# Patient Record
Sex: MALE | Race: WHITE | Employment: FULL TIME | URBAN - METROPOLITAN AREA
[De-identification: names, ages, dates, MRNs, and addresses within clinical notes are randomized per-mention and may not be internally consistent; named-entity substitution may affect disease eponyms.]

---

## 2017-03-06 ENCOUNTER — OFFICE VISIT (OUTPATIENT)
Dept: INTERNAL MEDICINE | Facility: CLINIC | Age: 32
End: 2017-03-06
Payer: COMMERCIAL

## 2017-03-06 VITALS
SYSTOLIC BLOOD PRESSURE: 132 MMHG | OXYGEN SATURATION: 100 % | DIASTOLIC BLOOD PRESSURE: 78 MMHG | HEIGHT: 70 IN | WEIGHT: 141 LBS | TEMPERATURE: 98.1 F | BODY MASS INDEX: 20.19 KG/M2 | HEART RATE: 66 BPM

## 2017-03-06 DIAGNOSIS — Z71.85 VACCINE COUNSELING: ICD-10-CM

## 2017-03-06 DIAGNOSIS — Z00.00 ROUTINE GENERAL MEDICAL EXAMINATION AT A HEALTH CARE FACILITY: Primary | ICD-10-CM

## 2017-03-06 DIAGNOSIS — Z13.1 SCREENING FOR DIABETES MELLITUS: ICD-10-CM

## 2017-03-06 DIAGNOSIS — Z71.89 OTHER SPECIFIED COUNSELING: Chronic | ICD-10-CM

## 2017-03-06 DIAGNOSIS — Z13.220 SCREENING FOR LIPOID DISORDERS: ICD-10-CM

## 2017-03-06 LAB
CHOLEST SERPL-MCNC: 124 MG/DL
HDLC SERPL-MCNC: 47 MG/DL
LDLC SERPL CALC-MCNC: 60 MG/DL
NONHDLC SERPL-MCNC: 77 MG/DL
TRIGL SERPL-MCNC: 87 MG/DL

## 2017-03-06 PROCEDURE — 90632 HEPA VACCINE ADULT IM: CPT | Performed by: FAMILY MEDICINE

## 2017-03-06 PROCEDURE — 36415 COLL VENOUS BLD VENIPUNCTURE: CPT | Performed by: FAMILY MEDICINE

## 2017-03-06 PROCEDURE — 90715 TDAP VACCINE 7 YRS/> IM: CPT | Performed by: FAMILY MEDICINE

## 2017-03-06 PROCEDURE — 90471 IMMUNIZATION ADMIN: CPT | Performed by: FAMILY MEDICINE

## 2017-03-06 PROCEDURE — 90472 IMMUNIZATION ADMIN EACH ADD: CPT | Performed by: FAMILY MEDICINE

## 2017-03-06 PROCEDURE — 82947 ASSAY GLUCOSE BLOOD QUANT: CPT | Performed by: FAMILY MEDICINE

## 2017-03-06 PROCEDURE — 99395 PREV VISIT EST AGE 18-39: CPT | Mod: 25 | Performed by: FAMILY MEDICINE

## 2017-03-06 PROCEDURE — 80061 LIPID PANEL: CPT | Performed by: FAMILY MEDICINE

## 2017-03-06 PROCEDURE — 90691 TYPHOID VACCINE IM: CPT | Performed by: FAMILY MEDICINE

## 2017-03-06 RX ORDER — CIPROFLOXACIN 500 MG/1
500 TABLET, FILM COATED ORAL 2 TIMES DAILY
Qty: 6 TABLET | Refills: 0 | Status: SHIPPED | OUTPATIENT
Start: 2017-03-06 | End: 2019-04-18

## 2017-03-06 NOTE — NURSING NOTE
"Chief Complaint   Patient presents with     Physical     fasting        Initial /78 (BP Location: Left arm, Patient Position: Chair)  Pulse 66  Temp 98.1  F (36.7  C) (Oral)  Ht 5' 10\" (1.778 m)  Wt 141 lb (64 kg)  SpO2 100%  BMI 20.23 kg/m2 Estimated body mass index is 20.23 kg/(m^2) as calculated from the following:    Height as of this encounter: 5' 10\" (1.778 m).    Weight as of this encounter: 141 lb (64 kg).  Medication Reconciliation: complete    "

## 2017-03-06 NOTE — PROGRESS NOTES
SUBJECTIVE:     CC: Brad Singh is an 32 year old male who presents for preventative health visit.     Healthy Habits:    Do you get at least three servings of calcium containing foods daily (dairy, green leafy vegetables, etc.)? yes    Amount of exercise or daily activities, outside of work: 2-3 day(s) per week    Problems taking medications regularly Yes swallowing some     Medication side effects: No    Have you had an eye exam in the past two years? yes    Do you see a dentist twice per year? yes  Do you have sleep apnea, excessive snoring or daytime drowsiness?no      Today's PHQ-2 Score:   PHQ-2 ( 1999 Pfizer) 3/6/2017 5/11/2015   Q1: Little interest or pleasure in doing things 0 0   Q2: Feeling down, depressed or hopeless 0 0   PHQ-2 Score 0 0       Abuse: Current or Past(Physical, Sexual or Emotional)- No  Do you feel safe in your environment - Yes    Social History   Substance Use Topics     Smoking status: Never Smoker     Smokeless tobacco: Never Used     Alcohol use Yes      Comment: 1-2 drinks monthly     Patient does not drink more than 3 drinks per day or 7 drinks per week.    Last PSA: No results found for: PSA    No results for input(s): CHOL, HDL, LDL, TRIG, CHOLHDLRATIO, NHDL in the last 46633 hours.    Reviewed orders with patient. Reviewed health maintenance and updated orders accordingly - Yes    Reviewed and updated as needed this visit by clinical staff  Tobacco  Allergies  Meds  Med Hx  Fam Hx         Reviewed and updated as needed this visit by Provider            ROS:  C: NEGATIVE for fever, chills, change in weight  I: NEGATIVE for worrisome rashes, moles or lesions  E: NEGATIVE for vision changes or irritation  ENT: NEGATIVE for ear, mouth and throat problems  R: NEGATIVE for significant cough or SOB  CV: NEGATIVE for chest pain, palpitations or peripheral edema  GI: NEGATIVE for nausea, abdominal pain, heartburn, or change in bowel habits   male: negative for dysuria,  "hematuria, decreased urinary stream, erectile dysfunction, urethral discharge  M: NEGATIVE for significant arthralgias or myalgia  N: NEGATIVE for weakness, dizziness or paresthesias  P: NEGATIVE for changes in mood or affect    OBJECTIVE:     /78 (BP Location: Left arm, Patient Position: Chair)  Pulse 66  Temp 98.1  F (36.7  C) (Oral)  Ht 5' 10\" (1.778 m)  Wt 141 lb (64 kg)  SpO2 100%  BMI 20.23 kg/m2  EXAM:  GENERAL: healthy, alert and no distress  EYES: Eyes grossly normal to inspection, PERRL and conjunctivae and sclerae normal  HENT: ear canals and TM's normal, nose and mouth without ulcers or lesions  NECK: no adenopathy, no asymmetry, masses, or scars and thyroid normal to palpation  RESP: lungs clear to auscultation - no rales, rhonchi or wheezes  CV: regular rate and rhythm, normal S1 S2, no S3 or S4, no murmur, click or rub, no peripheral edema and peripheral pulses strong  ABDOMEN: soft, nontender, no hepatosplenomegaly, no masses and bowel sounds normal  MS: no gross musculoskeletal defects noted, no edema  SKIN: no suspicious lesions or rashes  NEURO: Normal strength and tone, mentation intact and speech normal  PSYCH: mentation appears normal, affect normal/bright    ASSESSMENT/PLAN:     1. Routine general medical examination at a health care facility  - TDAP (ADACEL AGES 11-64)  - ciprofloxacin (CIPRO) 500 MG tablet; Take 1 tablet (500 mg) by mouth 2 times daily  Dispense: 6 tablet; Refill: 0  - TYPHOID VACCINE, IM    2. Other specified counseling  Travel data reviewed from cdc.gov and discussed with patient  - typhoid polysaccharide (TYPHIM VI) 25 MCG/0.5ML injection; Inject 0.5 mLs into the muscle once for 1 dose  Dispense: 0.5 mL; Refill: 0    3. Screening for lipoid disorders    - Lipid Profile (Chol, Trig, HDL, LDL calc)    4. Screening for diabetes mellitus    - Glucose    5. Vaccine counseling    - HEPATITIS A VACCINE (ADULT)  - TYPHOID VACCINE, IM    COUNSELING:  Reviewed preventive " "health counseling, as reflected in patient instructions       Regular exercise       Healthy diet/nutrition       Family planning         reports that he has never smoked. He has never used smokeless tobacco.    Estimated body mass index is 20.23 kg/(m^2) as calculated from the following:    Height as of this encounter: 5' 10\" (1.778 m).    Weight as of this encounter: 141 lb (64 kg).       Counseling Resources:  ATP IV Guidelines  Pooled Cohorts Equation Calculator  FRAX Risk Assessment  ICSI Preventive Guidelines  Dietary Guidelines for Americans, 2010  USDA's MyPlate  ASA Prophylaxis  Lung CA Screening    Mitchel Head MD  [facility]  "

## 2017-03-07 LAB — GLUCOSE SERPL-MCNC: 73 MG/DL (ref 70–99)

## 2017-05-24 DIAGNOSIS — T78.2XXS ANAPHYLACTIC REACTION, SEQUELA: Primary | ICD-10-CM

## 2017-05-24 RX ORDER — EPINEPHRINE 0.3 MG/.3ML
0.3 INJECTION SUBCUTANEOUS PRN
Qty: 0.6 ML | Refills: 1 | Status: SHIPPED | OUTPATIENT
Start: 2017-05-24

## 2017-05-24 NOTE — TELEPHONE ENCOUNTER
Pt's father calling.  Pt needs refill in Epi pen.  Leaving on his honeymoon 6-3-17 to [facility].    Epi pen      Last Written Prescription Date: 5-14-08  Last Fill Quantity: 1 pk,  # refills: 3   Last Office Visit with [facility], [facility] or [facility] prescribing provider: 3-6-17    Routing refill request to provider for review/approval because:  Drug not active on patient's medication list    Please advise, thanks.

## 2018-04-03 ENCOUNTER — TELEPHONE (OUTPATIENT)
Dept: INTERNAL MEDICINE | Facility: CLINIC | Age: 33
End: 2018-04-03

## 2018-04-03 NOTE — TELEPHONE ENCOUNTER
Reason for Call:  Other    Detailed comments: pt walked into clinic requesting his blood type. He didn't know if he had ever been tested for it. If not he would like to be tested & typed    Phone Number Patient can be reached at: Home number on file [phone] (home)    Best Time: anytime    Can we leave a detailed message on this number? YES    Call taken on 4/3/2018 at 10:33 AM by Jaimie Reddy

## 2018-04-03 NOTE — TELEPHONE ENCOUNTER
Pt informed he has not had blood type tested within [place]. His wife is pregnant and he was interested in finding out his blood type before her appt today - states she would have to get Rhogam if they don't know his blood type. Informed Brad this test would not be back in time for her appt today. If he is interested in having this done, it would likely not be covered by insurance. Offered number for the [place] [employer] if interested in finding out cost. Pt declines, states he will call back if he decides he wants to have this tested.

## 2018-10-23 ENCOUNTER — RADIANT APPOINTMENT (OUTPATIENT)
Dept: GENERAL RADIOLOGY | Facility: CLINIC | Age: 33
End: 2018-10-23
Attending: PODIATRIST
Payer: COMMERCIAL

## 2018-10-23 ENCOUNTER — OFFICE VISIT (OUTPATIENT)
Dept: PODIATRY | Facility: CLINIC | Age: 33
End: 2018-10-23
Payer: COMMERCIAL

## 2018-10-23 VITALS
HEIGHT: 70 IN | DIASTOLIC BLOOD PRESSURE: 70 MMHG | BODY MASS INDEX: 20.76 KG/M2 | WEIGHT: 145 LBS | SYSTOLIC BLOOD PRESSURE: 102 MMHG

## 2018-10-23 DIAGNOSIS — M21.612 BUNION, LEFT: ICD-10-CM

## 2018-10-23 DIAGNOSIS — M20.42 HAMMERTOE OF LEFT FOOT: Primary | ICD-10-CM

## 2018-10-23 DIAGNOSIS — M20.42 HAMMERTOE OF LEFT FOOT: ICD-10-CM

## 2018-10-23 PROCEDURE — 99244 OFF/OP CNSLTJ NEW/EST MOD 40: CPT | Performed by: PODIATRIST

## 2018-10-23 PROCEDURE — 73630 X-RAY EXAM OF FOOT: CPT | Mod: LT

## 2018-10-23 NOTE — LETTER
"    10/23/2018         RE: Brad Singh  [address] [facility] [address]        Dear Colleague,    Thank you for referring your patient, Brad Singh, to the [facility]. Please see a copy of my visit note below.    Foot & Ankle Surgery  October 23, 2018    CC: \"left foot pain - middle toe  Toes cross over\"    I was asked to see Brad Singh regarding the chief complaint by:  Dr. JOHANNA Lawson    HPI:  Pt is a 33 year old male who presents with above complaint.  L foot pain x \"years\".  Goal is for \"Pain subsiding - toes?\" describes throbbing pain, 7/10 \"when I walk\", worse with walking\".  He has tried ice without improvement.  1-2 weeks pain with walking L foot, can't walk as fast.  Previous L bunion surgery in \"7th grade\".  Bunion may be returning and 2nd toe is being dorsally displaced.      ROS:   Pos for CC.  The patient denies current nausea, vomiting, chills, fevers, belly pain, calf pain, chest pain or SOB.  Complete remainder of ROS is otherwise neg.    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        ALL:     Allergies   Allergen Reactions     Bee Venom Anaphylaxis     Other  [No Clinical Screening - See Comments]      PN: bee stings       FMH:    Family History   Problem Relation Age of Onset     Hypertension Father      Hypertension Paternal Grandmother      Allergies Mother        SocHx:    Social History "     Social History     Marital status: Single     Spouse name: N/A     Number of children: N/A     Years of education: N/A     Occupational History     Student; apprentice in cabinet -making      Social History Main Topics     Smoking status: Never Smoker     Smokeless tobacco: Never Used     Alcohol use Yes      Comment: 1-2 drinks monthly     Drug use: No     Sexual activity: No     Other Topics Concern     Not on file     Social History Narrative           EXAMINATION:  Gen:   No apparent distress  Neuro:   A&Ox3, no deficits  Psych:    Answering questions appropriately for age and situation with normal affect  Head:    NCAT  Eye:    Visual scanning without deficit  Ear:    Response to auditory stimuli wnl  Lung:    Non-labored breathing on RA noted  Abd:    NTND per patient report  Lymph:    Neg for pitting/non-pitting edema BLE  Vasc:    Pulses palpable, CFT minimally delayed  Neuro:    Light touch sensation intact to all sensory nerve distributions without paresthesias  Derm:    Neg for nodules, lesions or ulcerations  MSK:    Left lower extremity - bunion is stiff, not reducible.  Limited ROM.  2nd toe is dorsally displaced but rectus.  The 2nd toe is sensitive to touch/tender at DIPJ.  No plantar 2nd MPJ pain.    Calf:    Neg for redness, swelling or tenderness      Imaging:  xrays L foot 10/23/18 - IMPRESSION: Bunionectomy and 2 first metatarsal diaphyseal screws  presumably related to a healed osteotomy. Great toe IP joint  arthrodesis. Second toe middle phalangeal hypoplasia versus osteotomy.  Third, fourth, and fifth toe middle/distal phalangeal fusion,  presumably congenital. Accessory navicular ossification. Flatfoot  deformity.    Assessment:  33 year old male with left 2nd toe pain/hammertoe in setting of recurrent bunion      Plan:  Discussed etiologies, anatomy and options  1.  Left 2nd toe pain/hammertoe in setting of recurrent bunion.   -personally reviewed imaging  -comfortable accommodative  "shoe gear  -toe cap/cover handout dispensed for toe protection  -OTC med lidocaine handout dispensed for topical treatment of sensitive toe  -discussed his toe pain is not typical hammertoe pain.  The toe seems sensitive/tender to simple touch.  Consider nerve damage from previous surgery, although his surgery was a few decades ago, while patient reports \"years\" history of pain and 1-2 weeks of worsening discomfort  -briefly discussed surgical options.  If the 2nd toe is to be fixed(rectus toe, but dorsally displaced), the bunion will need to be addressed as well.      Follow up:  prn or sooner with acute issues      Patient's medical history was reviewed today    Body mass index is 20.81 kg/(m^2).          Gold Sy DPM FACFAS FACFAOM  Podiatric Foot & Ankle Surgeon  [facility]  [phone]      Again, thank you for allowing me to participate in the care of your patient.        Sincerely,        Gold Sy DPM, CHRIS    "

## 2018-10-23 NOTE — PROGRESS NOTES
"Foot & Ankle Surgery  October 23, 2018    CC: \"left foot pain - middle toe  Toes cross over\"    I was asked to see Brad Singh regarding the chief complaint by:  Dr. JOHANNA Lawson    HPI:  Pt is a 33 year old male who presents with above complaint.  L foot pain x \"years\".  Goal is for \"Pain subsiding - toes?\" describes throbbing pain, 7/10 \"when I walk\", worse with walking\".  He has tried ice without improvement.  1-2 weeks pain with walking L foot, can't walk as fast.  Previous L bunion surgery in \"7th grade\".  Bunion may be returning and 2nd toe is being dorsally displaced.      ROS:   Pos for CC.  The patient denies current nausea, vomiting, chills, fevers, belly pain, calf pain, chest pain or SOB.  Complete remainder of ROS is otherwise neg.    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        ALL:     Allergies   Allergen Reactions     Bee Venom Anaphylaxis     Other  [No Clinical Screening - See Comments]      PN: bee stings       FMH:    Family History   Problem Relation Age of Onset     Hypertension Father      Hypertension Paternal Grandmother      Allergies Mother        SocHx:    Social History     Social History     Marital status: Single     Spouse name: N/A     Number of children: N/A     Years of education: N/A     Occupational History     Student; apprentice in cabinet -making      Social History Main Topics     Smoking " status: Never Smoker     Smokeless tobacco: Never Used     Alcohol use Yes      Comment: 1-2 drinks monthly     Drug use: No     Sexual activity: No     Other Topics Concern     Not on file     Social History Narrative           EXAMINATION:  Gen:   No apparent distress  Neuro:   A&Ox3, no deficits  Psych:    Answering questions appropriately for age and situation with normal affect  Head:    NCAT  Eye:    Visual scanning without deficit  Ear:    Response to auditory stimuli wnl  Lung:    Non-labored breathing on RA noted  Abd:    NTND per patient report  Lymph:    Neg for pitting/non-pitting edema BLE  Vasc:    Pulses palpable, CFT minimally delayed  Neuro:    Light touch sensation intact to all sensory nerve distributions without paresthesias  Derm:    Neg for nodules, lesions or ulcerations  MSK:    Left lower extremity - bunion is stiff, not reducible.  Limited ROM.  2nd toe is dorsally displaced but rectus.  The 2nd toe is sensitive to touch/tender at DIPJ.  No plantar 2nd MPJ pain.    Calf:    Neg for redness, swelling or tenderness      Imaging:  xrays L foot 10/23/18 - IMPRESSION: Bunionectomy and 2 first metatarsal diaphyseal screws  presumably related to a healed osteotomy. Great toe IP joint  arthrodesis. Second toe middle phalangeal hypoplasia versus osteotomy.  Third, fourth, and fifth toe middle/distal phalangeal fusion,  presumably congenital. Accessory navicular ossification. Flatfoot  deformity.    Assessment:  33 year old male with left 2nd toe pain/hammertoe in setting of recurrent bunion      Plan:  Discussed etiologies, anatomy and options  1.  Left 2nd toe pain/hammertoe in setting of recurrent bunion.   -personally reviewed imaging  -comfortable accommodative shoe gear  -toe cap/cover handout dispensed for toe protection  -OTC med lidocaine handout dispensed for topical treatment of sensitive toe  -discussed his toe pain is not typical hammertoe pain.  The toe seems sensitive/tender to simple  "touch.  Consider nerve damage from previous surgery, although his surgery was a few decades ago, while patient reports \"years\" history of pain and 1-2 weeks of worsening discomfort  -briefly discussed surgical options.  If the 2nd toe is to be fixed(rectus toe, but dorsally displaced), the bunion will need to be addressed as well.      Follow up:  prn or sooner with acute issues      Patient's medical history was reviewed today    Body mass index is 20.81 kg/(m^2).          Gold Sy DPM FACFAS FACFAOM  Podiatric Foot & Ankle Surgeon  [facility]  [phone]    "

## 2018-10-23 NOTE — PATIENT INSTRUCTIONS
Thank you for choosing [facility] / [facility]!    DR. DIAMOND'S CLINIC LOCATIONS:   MONDAY - [place] TUESDAY - [place]   [address] [facility]  [address]   [place] [address] [place] [address]   [phone]       THURSDAY AM - [place] THURSDAY PM - [place]   [address]   [place] [address] [place], [place] [address] [phone]       FRIDAY AM - [place] SET UP SURGERY: [phone] [address] APPOINTMENTS: [phone]   [place] [address] BILLING QUESTIONS: [phone] FAX NUMBER: [phone]     Follow Up: as needed    Products available online if you do not have insurance coverage:    1.  Iodosorb topical wound care gel     2.  Woun'Dres topical wound care gel                      3.  Topical lidocaine gel      HAMMERTOE PADS / TOE SPLINTS      TOE COVERS/CAPS            Body Mass Index (BMI)  Many things can cause foot and ankle problems. Foot structure, activity level, foot mechanics and injuries are common causes of pain. One very important issue that often goes unmentioned, is body weight. Extra weight can cause increased stress on muscles, ligaments, bones and tendons. Sometimes just a few extra pounds is all it takes to put one over her/his threshold. Without reducing that stress, it can be difficult to alleviate pain. Some people are uncomfortable addressing this issue, but we feel it is important for you to think about it. As Foot &  Ankle specialists, our job is addressing the lower extremity problem and possible causes. Regarding extra body weight, we encourage patients to discuss diet and weight management plans with their primary care doctors. It is this team approach that gives you the best opportunity for pain relief and getting you back on your feet.

## 2018-10-23 NOTE — MR AVS SNAPSHOT
After Visit Summary   10/23/2018    Brad Singh    MRN: [id number]           Patient Information     Date Of Birth          1985        Visit Information        Provider Department      10/23/2018 9:30 AM Gold Diamond DPM [facility] P[facility]        Care Instructions    Thank you for choosing [facility] / [facility]!    DR. DIAMOND'S CLINIC LOCATIONS:   MONDAY - [place] TUESDAY - [place]   [address] [facility]  [address]   [place] [address] [place] [address]   [phone]       THURSDAY AM - [place] THURSDAY PM - [place]   [address]   [place] [address] [place], [place] [address] [phone]       FRIDAY AM - [place] SET UP SURGERY: [phone] [address] APPOINTMENTS: [phone]   [place] [address] BILLING QUESTIONS: [phone] FAX NUMBER: [phone]     Follow Up: as needed    Products available online if you do not have insurance coverage:    1.  Iodosorb topical wound care gel     2.  Woun'Dres topical wound care gel                      3.  Topical lidocaine gel      HAMMERTOE PADS / TOE SPLINTS      TOE COVERS/CAPS            Body Mass Index (BMI)  Many things can cause foot and ankle problems. Foot structure, activity level, foot mechanics and injuries are common causes of pain. One very important issue that often goes unmentioned, is body weight. Extra weight can cause increased stress on muscles, ligaments, bones and tendons. Sometimes just a few extra pounds is all it takes to put one over her/his threshold. Without reducing that stress, it can be difficult to alleviate pain. Some people are uncomfortable addressing this issue, but we feel it is important for you to think about it. As Foot &  Ankle specialists, our job is addressing the lower extremity problem and possible causes. Regarding extra body weight, we encourage patients to  "discuss diet and weight management plans with their primary care doctors. It is this team approach that gives you the best opportunity for pain relief and getting you back on your feet.              Follow-ups after your visit        Who to contact     If you have questions or need follow up information about today's clinic visit or your schedule please contact [facility] directly at [phone].  Normal or non-critical lab and imaging results will be communicated to you by [employer], letter or phone within 4 business days after the clinic has received the results. If you do not hear from us within 7 days, please contact the clinic through [employer] or phone. If you have a critical or abnormal lab result, we will notify you by phone as soon as possible.  Submit refill requests through [facility] or call your pharmacy and they will forward the refill request to us. Please allow 3 business days for your refill to be completed.          Additional Information About Your Visit        [employer] Information     [facility] gives you secure access to your electronic health record. If you see a primary care provider, you can also send messages to your care team and make appointments. If you have questions, please call your primary care clinic.  If you do not have a primary care provider, please call [phone] and they will assist you.        Care EveryWhere ID     This is your Care EveryWhere ID. This could be used by other organizations to access your [facility] medical records  [id number]        Your Vitals Were     Height BMI (Body Mass Index)                5' 10\" (1.778 m) 20.81 kg/m2           Blood Pressure from Last 3 Encounters:   10/23/18 102/70   03/06/17 132/78   11/12/15 110/64    Weight from Last 3 Encounters:   10/23/18 145 lb (65.8 kg)   03/06/17 141 lb (64 kg)   11/12/15 140 lb (63.5 kg)              Today, you had the following     No orders found for display       Primary Care Provider Office Phone # " Fax #    Jalen Lawson [phone]       [address] [facility] [address]  [facility] [address]        Equal Access to Services     VIRGINIA BERKOWITZ : Hadjadiel shruthi banda nicol Sooscar, wamichaelda luqjarvis, qaludyta kaalmada areli, jez hillmata sean. So [facility] [phone].    ATENCIÓN: Si habla español, tiene a kearney disposición servicios gratuitos de asistencia lingüística. Llame al [phone].    We comply with applicable federal civil rights laws and [place] laws. We do not discriminate on the basis of race, color, national origin, age, disability, sex, sexual orientation, or gender identity.            Thank you!     Thank you for choosing [facility]  for your care. Our goal is always to provide you with excellent care. Hearing back from our patients is one way we can continue to improve our services. Please take a few minutes to complete the written survey that you may receive in the mail after your visit with us. Thank you!             Your Updated Medication List - Protect others around you: Learn how to safely use, store and throw away your medicines at www.disposemymeds.org.          This list is accurate as of 10/23/18  9:49 AM.  Always use your most recent med list.                   Brand Name Dispense Instructions for use Diagnosis    ciprofloxacin 500 MG tablet    CIPRO    6 tablet    Take 1 tablet (500 mg) by mouth 2 times daily    Routine general medical examination at a health care facility       EPINEPHrine 0.3 MG/0.3ML injection 2-pack    EPIPEN/ADRENACLICK/or ANY BX GENERIC EQUIV    0.6 mL    Inject 0.3 mLs (0.3 mg) into the muscle as needed for anaphylaxis    Anaphylactic reaction, sequela

## 2019-04-18 ENCOUNTER — OFFICE VISIT (OUTPATIENT)
Dept: INTERNAL MEDICINE | Facility: CLINIC | Age: 34
End: 2019-04-18
Payer: COMMERCIAL

## 2019-04-18 VITALS
TEMPERATURE: 97.8 F | OXYGEN SATURATION: 97 % | WEIGHT: 146.4 LBS | BODY MASS INDEX: 20.96 KG/M2 | HEIGHT: 70 IN | RESPIRATION RATE: 16 BRPM | HEART RATE: 56 BPM | SYSTOLIC BLOOD PRESSURE: 112 MMHG | DIASTOLIC BLOOD PRESSURE: 76 MMHG

## 2019-04-18 DIAGNOSIS — M62.89 MUSCLE TIGHTNESS: ICD-10-CM

## 2019-04-18 DIAGNOSIS — M54.50 ACUTE LEFT-SIDED LOW BACK PAIN WITHOUT SCIATICA: Primary | ICD-10-CM

## 2019-04-18 PROCEDURE — 99214 OFFICE O/P EST MOD 30 MIN: CPT | Performed by: FAMILY MEDICINE

## 2019-04-18 RX ORDER — CYCLOBENZAPRINE HCL 5 MG
5 TABLET ORAL 2 TIMES DAILY PRN
Qty: 20 TABLET | Refills: 0 | Status: SHIPPED | OUTPATIENT
Start: 2019-04-18

## 2019-04-18 ASSESSMENT — MIFFLIN-ST. JEOR: SCORE: 1600.32

## 2019-04-18 NOTE — PATIENT INSTRUCTIONS
Patient Education     Exercises to Strengthen Your Lower Back  Strong lower back and abdominal muscles work together to support your spine. The exercises below will help strengthen the lower back. It is important that you begin exercising slowly and increase levels gradually.  Always begin any exercise program with stretching. If you feel pain while doing any of these exercises, stop and talk to your doctor about a more specific exercise program that better suits your condition.   Low back stretch  The point of stretching is to make you more flexible and increase your range of motion. Stretch only as much as you are able. Stretch slowly. Do not push your stretch to the limit. If at any point you feel pain while stretching, this is your (temporary) limit.    Lie on your back with your knees bent and both feet on the ground.    Slowly raise your left knee to your chest as you flatten your lower back against the floor. Hold for 5 seconds.    Relax and repeat the exercise with your right knee.    Do 10 of these exercises for each leg.    Repeat hugging both knees to your chest at the same time.  Building lower back strength  Start your exercise routine with 10 to 30 minutes a day, 1 to 3 times a day.  Initial exercises  Lying on your back:  1. Ankle pumps: Move your foot up and down, towards your head, and then away. Repeat 10 times with each foot.  2. Heel slides: Slowly bend your knee, drawing the heel of your foot towards you. Then slide your heel/foot from you, straightening your knee. Do not lift your foot off the floor (this is not a leg lift).  3. Abdominal contraction: Bend your knees and put your hands on your stomach. Tighten your stomach muscles. Hold for 5 seconds, then relax. Repeat 10 times.  4. Straight leg raise: Bend one leg at the knee and keep the other leg straight. Tighten your stomach muscles. Slowly lift your straight leg 6 to 12 inches off the floor and hold for up to 5 seconds. Repeat 10 times  on each side.  Standin. Wall squats: Stand with your back against the wall. Move your feet about 12 inches away from the wall. Tighten your stomach muscles, and slowly bend your knees until they are at about a 45 degree angle. Do not go down too far. Hold about 5 seconds. Then slowly return to your starting position. Repeat 10 times.  2. Heel raises: Stand facing the wall. Slowly raise the heels of your feet up and down, while keeping your toes on the floor. If you have trouble balancing, you can touch the wall with your hands. Repeat 10 times.  More advanced exercises  When you feel comfortable enough, try these exercises.  1. Kneeling lumbar extension: Begin on your hands and knees. At the same time, raise and straighten your right arm and left leg until they are parallel to the ground. Hold for 2 seconds and come back slowly to a starting position. Repeat with left arm and right leg, alternating 10 times.  2. Prone lumbar extension: Lie face down, arms extended overhead, palms on the floor. At the same time, raise your right arm and left leg as high as comfortably possible. Hold for 10 seconds and slowly return to start. Repeat with left arm and right leg, alternating 10 times. Gradually build up to 20 times. (Advanced: Repeat this exercise raising both arms and both legs a few inches off the floor at the same time. Hold for 5 seconds and release.)  3. Pelvic tilt: Lie on the floor on your back with your knees bent at 90 degrees. Your feet should be flat on the floor. Inhale, exhale, then slowly contract your abdominal muscles bringing your navel toward your spine. Let your pelvis rock back until your lower back is flat on the floor. Hold for 10 seconds while breathing smoothly.  4. Abdominal crunch: Perform a pelvic tilt (above) flattening your lower back against the floor. Holding the tension in your abdominal muscles, take another breath and raise your shoulder blades off the ground (this is not a full  sit-up). Keep your head in line with your body (don t bend your neck forward). Hold for 2 seconds, then slowly lower.  Date Last Reviewed: 6/1/2016 2000-2018 [employer]. [address] [place] [address], [place] [address]. All rights reserved. This information is not intended as a substitute for professional medical care. Always follow your healthcare professional's instructions.

## 2019-04-18 NOTE — LETTER
[place] [address]  [facility] [address]  Phone: [phone]    04/18/19    Brad Singh  [address] [facility] [address]      To whom it may concern:     Brad Singh was seen in the clinic today for low back pain.    Sincerely,      Robyn Garcia MD

## 2019-04-18 NOTE — PROGRESS NOTES
SUBJECTIVE:   Brad Singh is a 34 year old male who presents to clinic today for the following   health issues:      Back Pain       Duration: 2 days        Specific cause: work-related,  8 yrs    Description:   Location of pain: middle of back left  Character of pain: sharp, when breath in deep  Pain radiation:none  New numbness or weakness in legs, not attributed to pain:  no     Intensity: Currently 6/10, moderate    History:   Pain interferes with job: No, not sure  History of back problems: no prior back problems  Any previous MRI or X-rays: Yes--at [facility] here.  Date 2 yrs ago x-ray of hip  Sees a specialist for back pain:  No  Therapies tried without relief: acetaminophen (Tylenol), cold and heat    Alleviating factors:   Improved by: none takes Aleve     Precipitating factors:  Worsened by: Bending and deep breaths          Accompanying Signs & Symptoms:  Risk of Fracture:  None  Risk of Cauda Equina:  None  Risk of Infection:  None  Risk of Cancer:  None  SUBJECTIVE  HPI: Brad Singh is a 34 year old male who presents for evaluation of back pain  Symptoms began 2 day(s) ago, have been onset acute and are stable.  Pain is located in the low back left and middle of back left region, with radiation to does not radiate, and are at worst a 5 on a scale of 1-10.  Recent injury:none recalled by the patient, he at times have pain with deep breathing  Had no recent uri symptoms   He is  A  and 2 days back when he woke up from sleep noticed pain of the back   Denies any urinary symptoms   Personal hx of back pain is no prior back problems.  Pain is exacerbated by: changing position.  Pain is relieved by: OTC NSAIDs[unfilled] sx include: none.  Red flag symptoms: negative.    Past Medical History:   Diagnosis Date     Allergic rhinitis, cause unspecified     Uses OTC meds     Current Outpatient Medications   Medication Sig Dispense Refill     cyclobenzaprine (FLEXERIL) 5 MG tablet Take  "1 tablet (5 mg) by mouth 2 times daily as needed for muscle spasms 20 tablet 0     EPINEPHrine 0.3 MG/0.3ML injection Inject 0.3 mLs (0.3 mg) into the muscle as needed for anaphylaxis (Patient not taking: Reported on 4/18/2019) 0.6 mL 1     Social History     Tobacco Use     Smoking status: Never Smoker     Smokeless tobacco: Never Used   Substance Use Topics     Alcohol use: Yes     Comment: 1-2 drinks monthly       ROS:  10 point ROS of systems including Constitutional, Eyes, Respiratory, Cardiovascular, Gastroenterology, Genitourinary, Integumentary, , Psychiatric were all negative except for pertinent positives noted in my HPI           OBJECTIVE:  /76 (BP Location: Left arm, Patient Position: Sitting, Cuff Size: Adult Large)   Pulse 56   Temp 97.8  F (36.6  C) (Oral)   Resp 16   Ht 1.778 m (5' 10\")   Wt 66.4 kg (146 lb 6.4 oz)   SpO2 97%   BMI 21.01 kg/m    Back examination: Back symmetric, no curvature. ROM normal. No CVA tenderness.  [unfilled] leg raise test: negative  GENERAL APPEARANCE: healthy, alert and no distress  RESP: lungs clear to auscultation - no rales, rhonchi or wheezes  CV: regular rates and rhythm, normal S1 S2, no murmur noted  ABDOMEN:  soft, nontender, no HSM or masses and bowel sounds normal  NEURO: Normal strength and tone with no weakness or sensory deficit noted, reflexes normal   SKIN: no suspicious lesions or rashes    ASSESSMENT/IMPRESSION:  myofascial low back strain and lumbosacral strain  Brad was seen today for back pain.    Diagnoses and all orders for this visit:    Acute left-sided low back pain without sciatica  -     cyclobenzaprine (FLEXERIL) 5 MG tablet; Take 1 tablet (5 mg) by mouth 2 times daily as needed for muscle spasms    Muscle tightness        PLAN:1) PLEASE SEE ORDER SUMMARY  [unfilled]:      1.  Continue stretching and strengthening exercises.       2.  Continue prn heat or ice application.  Advised not to drive when taking the flexeril   Follow up " if  symptoms fail to improve or worsens   Pt understood and agreed with plan   Discussed if notices any worsening pain should follow up for further evaluation   Was given hand out for back stretching exercises   Discussed about doing good back stretching exercises     Robyn Garcia MD

## 2019-09-29 ENCOUNTER — HEALTH MAINTENANCE LETTER (OUTPATIENT)
Age: 34
End: 2019-09-29

## 2020-03-27 ENCOUNTER — NURSE TRIAGE (OUTPATIENT)
Dept: NURSING | Facility: CLINIC | Age: 35
End: 2020-03-27
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
## 2021-01-14 ENCOUNTER — HEALTH MAINTENANCE LETTER (OUTPATIENT)
Age: 36
End: 2021-01-14

## 2021-10-24 ENCOUNTER — HEALTH MAINTENANCE LETTER (OUTPATIENT)
Age: 36
End: 2021-10-24

## 2022-02-13 ENCOUNTER — HEALTH MAINTENANCE LETTER (OUTPATIENT)
Age: 37
End: 2022-02-13

## 2022-10-15 ENCOUNTER — HEALTH MAINTENANCE LETTER (OUTPATIENT)
Age: 37
End: 2022-10-15

## 2023-03-26 ENCOUNTER — HEALTH MAINTENANCE LETTER (OUTPATIENT)
Age: 38
End: 2023-03-26

## 2023-05-11 ENCOUNTER — OFFICE VISIT (OUTPATIENT)
Dept: PODIATRY | Facility: CLINIC | Age: 38
End: 2023-05-11
Payer: COMMERCIAL

## 2023-05-11 VITALS — HEIGHT: 70 IN | WEIGHT: 140 LBS | BODY MASS INDEX: 20.04 KG/M2

## 2023-05-11 DIAGNOSIS — Q70.30: ICD-10-CM

## 2023-05-11 DIAGNOSIS — M20.12 HALLUX ABDUCTO VALGUS, LEFT: ICD-10-CM

## 2023-05-11 DIAGNOSIS — M79.675 TOE PAIN, LEFT: Primary | ICD-10-CM

## 2023-05-11 DIAGNOSIS — L84 CORNS AND CALLOSITIES: ICD-10-CM

## 2023-05-11 PROCEDURE — 99203 OFFICE O/P NEW LOW 30 MIN: CPT | Performed by: PODIATRIST

## 2023-05-11 RX ORDER — CLINDAMYCIN PHOSPHATE 10 UG/ML
LOTION TOPICAL 2 TIMES DAILY
COMMUNITY
Start: 2023-04-18

## 2023-05-11 RX ORDER — ECONAZOLE NITRATE 10 MG/G
CREAM TOPICAL
COMMUNITY
Start: 2023-04-18

## 2023-05-11 NOTE — PATIENT INSTRUCTIONS
Thank you for choosing [facility] Podiatry / Foot & Ankle Surgery!    DR. DICKINSON'S CLINIC LOCATIONS:     [facility] TRIAGE LINE: [phone]   [address] [place] [address] APPOINTMENTS: [phone]   [place] [address] RADIOLOGY: [phone]   (Every other Tues - Wed - Fri PM) SET UP SURGERY: [phone]    PHYSICAL THERAPY: [phone]   [place] SPECIALTY BILLING QUESTIONS: [phone] [id number] FAX: [phone]   [place] [address]    (Thurs & Fri AM)      Follow up: as needed    CALLUS / CORNS / IPKs  When there is excessive friction or pressure on the skin, the body responds by making the skin thicker to protect the deeper structures from becoming exposed. While this works well to protect the deeper structures, the thickened skin can increase pressure and pain.    CALLUS: Flat, diffuse thickening are simple calluses and they are usually caused by friction. Often these are the result of rubbing on a shoe or going barefoot.    CORNS: Calluses with a central core between the toes are called corns. These result from prominent joints on adjacent toes rubbing together. Theses are a symptom of bone malalignment and will always recur unless the underlying bones are addressed surgically.    IPKs: Calluses with a central core on the ball of the foot are usually IPKs (intractable plantar keratosis). These are caused by excessive pressure from the metatarsals, the bones that make up the ball of the foot. Often one of these bones is too long or too prominent.  Again, these will always recur unless the underlying bone issue is addressed. There is no cure for these. They will either go away by themselves, recur, or more could develop.    ROUTINE MAINTENANCE  1. File them down with a pumice stone or callus file a couple times a week.   2. An electric callus removing device. Amope Pedi Perfect Electronic Pedicure Foot File and Callus Remover can be a good option.   3. Lotion can be applied to  soften the callus. A urea based cream such as Kersal or Vanicream or thicker cream with shea butter are good options.  4. Toe spacers or toe covers can be used for corns, gel pads can be used for other lesions on the bottom of the foot.   If there is a surgical pathology noted, such as a prominent bone, often this needs to be addressed surgically to minimize recurrence. However, sometimes the lesion simply migrates to another spot after surgery, so it is not a guaranteed cure.     **If you come back to clinic for treatment, insurance does not cover it, and you would be billed. This charge could range from $100 - $227**

## 2023-05-11 NOTE — PROGRESS NOTES
ASSESSMENT:  Encounter Diagnoses   Name Primary?     Toe pain, left Yes     Corns and callosities      Cutaneous syndactyly of toes      Hallux abducto valgus, left      MEDICAL DECISION MAKING:  The skin lesions involving the left second and third toe consistent with interdigital corn.  I explained how this is related to underlying bony architecture and skin irritation.  These toes are held tightly together due to syndactyly.    Using #15 scalpel, I pared both lesions down to healthy underlying epithelium.  No ulceration.    He is encouraged to try to file the lesions down.  We discussed a trial of a toe spacer, placed distally beyond the lesions.    If conservative cares not adequate, I discussed the arthroplasty procedure.      He asked about bunion surgery.  He had this in eighth grade.  He is not having pain related to the hallux abductovalgus.  If symptoms develop, fusion of the joint is likely the best option.  We discussed this.        Disclaimer: This note consists of symbols derived from keyboarding, dictation and/or voice recognition software. As a result, there may be errors in the script that have gone undetected. Please consider this when interpreting information found in this chart.    Sravan Prasad DPM, FACFAS, MS    [place] [facility] of Podiatry/Foot & Ankle Surgery      ____________________________________________________________________    HPI:       Skin lesion left second and third toe.   2 weeks  Aching pain  He reports picking at the lesion and removing some skin.  This provided relief.  He was evaluated in dermatology on 4/18/2023.  Topical medications provided.  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   *  *  Current  "Outpatient Medications   Medication Sig Dispense Refill     clindamycin (CLEOCIN T) 1 % external lotion Apply topically 2 times daily       econazole nitrate 1 % external cream APPLY TO FEET TWICE DAILY       cyclobenzaprine (FLEXERIL) 5 MG tablet Take 1 tablet (5 mg) by mouth 2 times daily as needed for muscle spasms (Patient not taking: Reported on 5/11/2023) 20 tablet 0     EPINEPHrine 0.3 MG/0.3ML injection Inject 0.3 mLs (0.3 mg) into the muscle as needed for anaphylaxis (Patient not taking: Reported on 4/18/2019) 0.6 mL 1         EXAM:    Vitals: Ht 1.778 m (5' 10\")   Wt 63.5 kg (140 lb)   BMI 20.09 kg/m    BMI: Body mass index is 20.09 kg/m .    Constitutional:  Brad Singh is in no apparent distress, appears well-nourished.  Cooperative with history and physical exam.    Vascular:  Pedal pulses are palpable for both the DP and PT arteries.  CFT < 3 sec.  No edema.      Neuro: Light touch sensation is intact to the L4, L5, S1 distributions  No evidence of weakness, spasticity, or contracture in the lower extremities.     Derm: Normal texture and turgor.  No erythema, ecchymosis, or cyanosis.  No open lesions.   There is a hyperkeratotic lesion on the medial aspect of the left third toe.  There is a smaller but similar lesion on the lateral aspect of the left second toe.    There is syndactyly of the left second and third toe and they contact each other at this, just distal to the proximal interphalangeal joint.    Musculoskeletal:    Lower extremity muscle strength is normal.  Left hallux is abducted.  It is stiff with limited sagittal plane range of motion.  Palpatory exam reveals independent proximal phalanges of the syndactylized left second and third toes.      "

## 2023-05-11 NOTE — LETTER
5/11/2023         RE: Brad Singh  [address] [facility] [address]        Dear Colleague,    Thank you for referring your patient, Brad Singh, to the [facility] PODIATRY. Please see a copy of my visit note below.    ASSESSMENT:  Encounter Diagnoses   Name Primary?     Toe pain, left Yes     Corns and callosities      Cutaneous syndactyly of toes      Hallux abducto valgus, left      MEDICAL DECISION MAKING:  The skin lesions involving the left second and third toe consistent with interdigital corn.  I explained how this is related to underlying bony architecture and skin irritation.  These toes are held tightly together due to syndactyly.    Using #15 scalpel, I pared both lesions down to healthy underlying epithelium.  No ulceration.    He is encouraged to try to file the lesions down.  We discussed a trial of a toe spacer, placed distally beyond the lesions.    If conservative cares not adequate, I discussed the arthroplasty procedure.      He asked about bunion surgery.  He had this in eighth grade.  He is not having pain related to the hallux abductovalgus.  If symptoms develop, fusion of the joint is likely the best option.  We discussed this.        Disclaimer: This note consists of symbols derived from keyboarding, dictation and/or voice recognition software. As a result, there may be errors in the script that have gone undetected. Please consider this when interpreting information found in this chart.    Sravan Prasad DPM, FACFAS, [facility] Department of Podiatry/Foot & Ankle Surgery      ____________________________________________________________________    HPI:       Skin lesion left second and third toe.   2 weeks  Aching pain  He reports picking at the lesion and removing some skin.  This provided relief.  He was evaluated in dermatology on 4/18/2023.  Topical medications provided.  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   *  *  Current Outpatient Medications   Medication Sig Dispense Refill     clindamycin (CLEOCIN T) 1 % external lotion Apply topically 2 times daily       econazole nitrate 1 % external cream APPLY TO FEET TWICE DAILY       cyclobenzaprine (FLEXERIL) 5 MG tablet Take 1 tablet (5 mg) by mouth 2 times daily as needed for muscle spasms (Patient not taking: Reported on 5/11/2023) 20 tablet 0     EPINEPHrine 0.3 MG/0.3ML injection Inject 0.3 mLs (0.3 mg) into the muscle as needed for anaphylaxis (Patient not taking: Reported on 4/18/2019) 0.6 mL 1         EXAM:    Vitals: Ht 1.778 m (5' 10\")   Wt 63.5 kg (140 lb)   BMI 20.09 kg/m    BMI: Body mass index is 20.09 kg/m .    Constitutional:  Brad Singh is in no apparent distress, appears well-nourished.  Cooperative with history and physical exam.    Vascular:  Pedal pulses are palpable for both the DP and PT arteries.  CFT < 3 sec.  No edema.      Neuro: Light touch sensation is intact to the L4, L5, S1 distributions  No evidence of weakness, spasticity, or contracture in the lower extremities.     Derm: Normal texture and turgor.  No erythema, ecchymosis, or cyanosis.  No open lesions.   There is a hyperkeratotic lesion on the medial aspect of the left third toe.  There is a smaller but similar lesion on the lateral aspect of the left second toe.    There is syndactyly of the left second and third toe and they contact each other at this, just distal to the proximal interphalangeal joint.    Musculoskeletal:    Lower extremity muscle strength is normal.  Left hallux is abducted.  It is stiff with limited sagittal plane range of motion.  Palpatory exam reveals independent proximal phalanges of the syndactylized left second and third " toes.          Again, thank you for allowing me to participate in the care of your patient.        Sincerely,        Sravan Prasad DPM

## 2024-05-26 ENCOUNTER — HEALTH MAINTENANCE LETTER (OUTPATIENT)
Age: 39
End: 2024-05-26

## 2025-06-14 ENCOUNTER — HEALTH MAINTENANCE LETTER (OUTPATIENT)
Age: 40
End: 2025-06-14